# Patient Record
Sex: FEMALE | Race: WHITE | Employment: OTHER | URBAN - METROPOLITAN AREA
[De-identification: names, ages, dates, MRNs, and addresses within clinical notes are randomized per-mention and may not be internally consistent; named-entity substitution may affect disease eponyms.]

---

## 2018-07-11 ENCOUNTER — HOSPITAL ENCOUNTER (OUTPATIENT)
Dept: PHYSICAL THERAPY | Facility: HOSPITAL | Age: 78
Setting detail: THERAPIES SERIES
Discharge: HOME | End: 2018-07-11
Payer: MEDICARE

## 2018-07-11 DIAGNOSIS — Z96.659 STATUS POST KNEE REPLACEMENT, UNSPECIFIED LATERALITY: Primary | ICD-10-CM

## 2018-07-11 PROCEDURE — 97163 PT EVAL HIGH COMPLEX 45 MIN: CPT | Mod: GP | Performed by: PHYSICAL THERAPIST

## 2018-07-11 PROCEDURE — 97110 THERAPEUTIC EXERCISES: CPT | Mod: GP | Performed by: PHYSICAL THERAPIST

## 2018-07-11 PROCEDURE — G8979 MOBILITY GOAL STATUS: HCPCS | Mod: GP,CK | Performed by: PHYSICAL THERAPIST

## 2018-07-11 PROCEDURE — G8978 MOBILITY CURRENT STATUS: HCPCS | Mod: GP,CK | Performed by: PHYSICAL THERAPIST

## 2018-07-12 NOTE — INTERDISCIPLINARY/THERAPY
[facility] PHYSICAL THERAPY  [address] [address]  Dept: [phone]    [facility] Physical Therapy Initial Evaluation     Patient Name: Key Patel  Referring Doctor: Dr. Gagan Holley   Date of Service:7/11/18  Medicare Onset Date: 5/30/18  Medicare SOC Date: 7/11/18  Medicare Prior Hospitalizations: N/A  Medicare Certification Period: 7/11/18 through 8/22/18  HICN: [id number]    Primary Medical Diagnosis: Status post knee replacement, unspecified laterality [Z96.659]     Treatment Diagnosis. s/p R TKA on 5/30/18 limiting pt's functional mobility     Visit Number: 1    Subjective:    Subjective Comments: Pt. reports that she underwent a R total knee replacement on 5/30/18. She reports that she had a 2-3 day stay in the hospital and was then discharged home and started home health physical therapy and stopped using an AD at the end of two weeks. She states that she then began outpatient therapy where the therapist was very aggressive with pushing on her knee to gain ROM and she states that she would end most sessions in tears. She states that she thinks that her and her  are visiting her sister for the next 3 weeks and her doctor wanted her to continue therapy while she was gone. She states that she never really got any home program from her previous PT but has been continuing to work on bending and straightening knee but she really isn't sure what she should expect and isn't sure if she is progressing well.          No past medical history on file.    No current outpatient prescriptions on file.    Patient has no allergy information on record.        Pain:  Pain Assessment  Pain Assessment: 0-10  Pain Score: 2  Pain Type: Surgical pain  Pain Location: Knee  Pain Orientation: Right  Pain Descriptors: Aching  Pain Frequency: Intermittent  Pain Onset: Ongoing  Clinical Progression: Not changed  Effect of Pain on Daily Activities: still getting cramping at night; pt prescibed  oxycotin for night pain; pt reports she has tried to decrease amount but is unable to sleep through the night without pain and cramping   Patient's Stated Pain Goal: No pain  Pain Interventions: Medication (See MAR), Cold applied    Activities limited by Patient's complaint: 1: walking , 2: standing  and 3: stairs       Objective:       Tests  Special Tests: slump test pos for pain in back and down leg; quad lag 10 degrees on R  General  Chart Reviewed: Yes  Therapy Treatment Diagnosis: s/p R TKA on 5/30/18 limiting pt's functional mobility      Ambulation  Ambulation: Assessed  Ambulation 1  Surface 1: Level surface  Device 1: No device  Assistance 1: Independent  Quality of Gait 1: step through gait pattern noted; slight antalgic on RLE            RLE Assessment  RLE Assessment: Exceptions to WFL  AROM RLE (degrees)  R Knee Flexion : 110 degrees  R Knee Extension: -3 degrees  LLE Assessment  LLE Assessment: Exceptions to WFL    Initial Treatment:  Supine  Supine-Exercise Comments: TKE heel lift, sciatic nerve sliders   Seated  Seated-Exercise Comments: knee flexion with forward reaching   Standing  Standing-Exercise Comments: mini squats, and hamstring curls   Other Activities  Other Activities Comments: much time was spent explaining typical recovery post total knee replacement including expectations for strength and ROM 6 weeks post op and typically recovery over the next year; educated pt. in signs of neural tension including cramping   Manual Therapy  Soft Tissue Mobilization: kinesiotape applied in japanese lantern technique for swelling, instructed pt to leave tape on up to 4 days but to remove sooner if skin irritation occurs   Equipment Use  Other Equipment Use: pt. educated in use of Nu Step     Rehab Goals/Potential/Assessment/Plan:    Short-Term Goals:    Short Term PT Goal 1: Pt. will be able to sleep through the night with less than 2 bouts of LE cramping without take pain med within 3 weeks.           Long-Term Goals:    Long Term PT Goal 1: Pt. will improve  R knee AROM from 0-120 degrees to facilitate improved function of RLE within 6 weeks.        Long Term PT Goal 2: Pt. will have negative slump test demonstrating improved neuro dynamics within 6 weeks.                  Assessment  Rehab Potential: Good  Problem List: Decreased strength, Decreased range of motion, Impaired balance, Pain  Assessment Comment: Pt. is a 77 yr old female who presents to physical therapy s/p R TKA on 5/30/2018. She initiated PT in [place] but is visiting her sister for the next 3 weeks and wants to continue therapy. She still demonstrates continued weakness, and ROM deficits consistent with diagnosis. She has had a previous poor experience with PT and slow progress for ROM and strength will be crucial. She requires skilled PT intervention to reduce pain and improved ability to perform ADLs with less pain and reducing fall risk.     Prognosis  Assessment: Decreased ADL status, Decreased LE ROM, Decreased LE strength, Decreased high-level ADLs  Prognosis: Good    Plan  Treatment Interventions: Gait training, Balance training, Stair training, Therapeutic exercises, Manual therapy, Modalities  PT Frequency: 1-2x/wk  Treatment Duration : 4-6 weeks   PT - Next Appointment: 07/13/18  Plan Comment: Pt. is in agreement with the above POC and goals; follow up on response to taping, prone hamstring curls   Date POC Due: 08/22/18        Thank you for allowing us to share in the care of this patient.  If you have any questions, recommendations, or further concerns regarding this patient, please feel free to contact me at [phone].  Signed by: ROSAS MORFIN, PT  7/12/2018  3:26 PM       ______________________________________________  Dr. Gagan Holley    Date  Time    * I have reviewed the plan of care and certify a continuing need for medically necessary services

## 2018-07-13 ENCOUNTER — HOSPITAL ENCOUNTER (OUTPATIENT)
Dept: PHYSICAL THERAPY | Facility: HOSPITAL | Age: 78
Setting detail: THERAPIES SERIES
Discharge: HOME | End: 2018-07-13
Payer: MEDICARE

## 2018-07-13 PROCEDURE — 97140 MANUAL THERAPY 1/> REGIONS: CPT | Mod: GP | Performed by: PHYSICAL THERAPIST

## 2018-07-13 PROCEDURE — 97110 THERAPEUTIC EXERCISES: CPT | Mod: GP | Performed by: PHYSICAL THERAPIST

## 2018-07-13 NOTE — INTERDISCIPLINARY/THERAPY
[facility] PT Daily Treatment Note      Patient Name: Key Patel  Date of Service: 7/13/2018      Visit Number: 2    Subjective:    Subjective Comments: Pt. reports she's been able to go up and down the stairs into the camper easier.         Pain:  Pain Assessment  Pain Assessment: 0-10  Pain Score: 2  Pain Type: Surgical pain  Pain Location: Knee  Pain Orientation: Right        Objective:      Treatment:      Supine  Supine-Exercise Comments: TKE heel lift x 10; x 10 combined with ball sqeueze, heel slides with active assist at end range; sciatic nerve sliders   Seated  Seated-Exercise Comments: resisted hamstring curl with lvl 3 band x 10   Standing  Standing-Exercise Comments: step up/down on 4 in step ascending with RLE descending with LLE; lateral step downs from 4 in step with UE support 2x10   Other Exercise  Other Exercise Comment: gait drills including high marching and on heel strike   Equipment Use  Other Equipment Use: Active warm up on Nu Step x 5 min        Rehab Goals/Potential/Assessment/Plan:    Short-Term Goals:    Short Term PT Goal 1: Pt. will be able to sleep through the night with less than 2 bouts of LE cramping without take pain med within 3 weeks.                     Long-Term Goals:    Long Term PT Goal 1: Pt. will improve  R knee AROM from 0-120 degrees to facilitate improved function of RLE within 6 weeks.        Long Term PT Goal 2: Pt. will have negative slump test demonstrating improved neuro dynamics within 6 weeks.                     Assessment  Rehab Potential: Good  Progress: Progressing toward goals  Problem List: Decreased strength, Decreased range of motion, Impaired balance, Pain  Assessment Comment: Pt. demonstrated improved control with descending step following lateral step downs.          Plan  Treatment Interventions: Gait training, Therapeutic exercises, Manual therapy, Modalities, Balance training, Stair training  PT Frequency: 1-2x/wk  Plan Comment: continue with  strengthening and motor control exercises         Signed by: ROSAS MORFIN, PT  7/13/2018  4:22 PM

## 2018-07-20 ENCOUNTER — HOSPITAL ENCOUNTER (OUTPATIENT)
Dept: PHYSICAL THERAPY | Facility: HOSPITAL | Age: 78
Setting detail: THERAPIES SERIES
Discharge: HOME | End: 2018-07-20
Payer: MEDICARE

## 2018-07-20 PROCEDURE — 97110 THERAPEUTIC EXERCISES: CPT | Mod: GP

## 2018-07-20 PROCEDURE — 97140 MANUAL THERAPY 1/> REGIONS: CPT | Mod: GP

## 2018-07-20 NOTE — INTERDISCIPLINARY/THERAPY
"[facility] PT Daily Treatment Note      Patient Name: Key Patel  Date of Service: 7/20/2018      Visit Number: 3    Subjective:    Subjective Comments: Increased pain especially on inside of knee especially night.  Using ice and meds to control pain.  Walking w/o AD on unven ground around [facility].  Sleeping better through the night waking about 3 times per night because of pain and ms spasms.  Working on scar mobilizations.        Pain:  Pain Assessment  Pain Assessment: 0-10  Pain Score: 8  Pain Type: Surgical pain  Pain Location: Knee  Pain Orientation: Right  Pain Descriptors: Aching  Pain Interventions: Medication (See MAR), Cold applied    Objective:    PROM RLE (degrees)  R Knee Flexion : 110 degrees  R Knee Extension: -8 degrees  Strength RLE  RLE Strength Comment: extensor lag= -18    Treatment:      Supine  Supine-Exercise Comments: Heel slides on wall.  QS on walll w/ 5 sec holds. SAQ w/ holds 1/2 way through motion eccentrically and concentrically.    Seated  Seated-Exercise Comments: Knee flex/ext bilaterally using green T-band tied around both LE 20 reps 2 sets. Reviewed sciatic nerve stretching in sitting.  Prone  Prone-Exercise Comments: Knee flexion  Standing  Standing-Exercise Comments: Lateral and fwd step ups and step down \"dips\" (not completely stepping down) off 6 inch step w/ parallel bar support  Other Activities  Other Activities Comments: Kinesiotape crossing \"laterns\" w/ 25% stretch.  Skin intact prior w/o redness.  Advised to remove in 4 day sor sooner if painful or itchy.   Manual Therapy  Soft Tissue Mobilization: C-strokes distal quad and med and lateral to incision.  Joint Mobilization: Grade 2 ant/post femoral tibial jt mobs.  Equipment Use  Recumbent Stepper: NuStep for 15 min (on own; no charge)       Rehab Goals/Potential/Assessment/Plan:    Short-Term Goals:    Short Term PT Goal 1: Pt. will be able to sleep through the night with less than 2 bouts of LE cramping without take " pain med within 3 weeks.        Long-Term Goals:    Long Term PT Goal 1: Pt. will improve  R knee AROM from 0-120 degrees to facilitate improved function of RLE within 6 weeks.        Long Term PT Goal 2: Pt. will have negative slump test demonstrating improved neuro dynamics within 6 weeks.     Assessment  Rehab Potential: Good  Progress: Progressing toward goals  Problem List: Decreased strength, Decreased range of motion, Impaired balance, Decreased mobility, Pain  Assessment Comment: Cont. discomfort through knee.  Concerned about progress.      Prognosis  Prognosis: Good    Plan  Treatment Interventions: Gait training, Therapeutic activities, Therapeutic exercises, Manual therapy  PT Frequency: 1-2x/wk  Plan Comment: Cont. per POC adv w/ stretching and strenghening.  Manual therapy and taping to decrease tension and increase circulation.        Signed by: SAY HILL PT  7/20/2018  2:54 PM

## 2018-07-23 ENCOUNTER — HOSPITAL ENCOUNTER (OUTPATIENT)
Dept: PHYSICAL THERAPY | Facility: HOSPITAL | Age: 78
Setting detail: THERAPIES SERIES
Discharge: HOME | End: 2018-07-23
Payer: MEDICARE

## 2018-07-23 PROCEDURE — 97110 THERAPEUTIC EXERCISES: CPT | Mod: GP | Performed by: PHYSICAL THERAPIST

## 2018-07-23 PROCEDURE — 97112 NEUROMUSCULAR REEDUCATION: CPT | Mod: GP | Performed by: PHYSICAL THERAPIST

## 2018-07-23 PROCEDURE — 97116 GAIT TRAINING THERAPY: CPT | Mod: GP | Performed by: PHYSICAL THERAPIST

## 2018-07-23 NOTE — INTERDISCIPLINARY/THERAPY
"[facility] PT Daily Treatment Note      Patient Name: Key Patel  Date of Service: 7/23/2018  Visit Number: 4    Subjective:  Subjective Comments: Pt. reports she experienced a fall on the grass yesterday, felt like her knee \"gave\" out.    Pain:  No denies pain.    Objective:  AROM RLE (degrees)  R Knee Flexion : 115 degrees  R Knee Extension: -5 degrees  PROM RLE (degrees)  R Knee Extension: -3 degrees  Strength RLE  RLE Strength Comment: extensor lag -10 degrees, unable to ascend 4\" step with smooth transition    Treatment:  Supine-Exercise Comments: LAQ's and SAQ's with therapist assist to full knee extension and hold with eccentric lengthening, setaed hamstring stretch with active quad sets 5 second hold 15 reps  Seated-Exercise Comments: knee flexion stretch, LAQ's  Standing-Exercise Comments: lift and place to 6\" step x 20 reps  Other Activities Comments: Kinesio tape lymphatic application x 2     Rehab Goals/Potential/Assessment/Plan:  Assessment  Rehab Potential: Good  Progress: Progressing toward goals  Problem List: Decreased strength, Decreased range of motion, Decreased endurance    Plan  Treatment Interventions: Gait training, Therapeutic activities, Therapeutic exercises  PT Frequency: 1-2x/wk  Plan Comment: Continue per POC    Recommendation  Recommendations for Therapy: Continue skilled therapy    Signed by: BRISEIDA MG, PT  7/23/2018  3:51 PM  "

## 2018-07-25 ENCOUNTER — HOSPITAL ENCOUNTER (OUTPATIENT)
Dept: PHYSICAL THERAPY | Facility: HOSPITAL | Age: 78
Setting detail: THERAPIES SERIES
Discharge: HOME | End: 2018-07-25
Payer: MEDICARE

## 2018-07-25 PROCEDURE — 97140 MANUAL THERAPY 1/> REGIONS: CPT | Mod: GP

## 2018-07-25 PROCEDURE — 97110 THERAPEUTIC EXERCISES: CPT | Mod: GP

## 2018-07-25 PROCEDURE — G8980 MOBILITY D/C STATUS: HCPCS | Mod: GP,CJ

## 2018-07-25 PROCEDURE — G8978 MOBILITY CURRENT STATUS: HCPCS | Mod: GP,CJ

## 2018-07-25 PROCEDURE — G8979 MOBILITY GOAL STATUS: HCPCS | Mod: GP,CK

## 2018-07-27 NOTE — INTERDISCIPLINARY/THERAPY
[facility] Physical Therapy Discharge Note      Patient Name: Key Patel  Referring Doctor: Dr. Gagan Holley  Date of Service: 7/25/2018     Visit Number: 5    Primary Medical Diagnosis: Status post knee replacement, unspecified laterality [Z96.659]     Treatment Diagnosis. s/p R TKA on 5/30/18 limiting pt's functional mobility     This patient has been discharged from Physical Therapy because pt will be traveling and then returning to [place] and will not be back at this facility.    Visits from start of care:  Treatments were initiated on 7/11/18  and 5 treatments have been provided. Treatments included stretching, strengthening, taping, and manual therapy.    Patient Compliance: good    Previously established goals:     Short-Term Goals:    Short Term PT Goal 1: Pt. will be able to sleep through the night with less than 2 bouts of LE cramping without take pain med within 3 weeks.   Short Term PT Goal Progress 1: Other (Comment) Not reassessed.               Long-Term Goals:    Long Term PT Goal 1: Pt. will improve  R knee AROM from 0-120 degrees to facilitate improved function of RLE within 6 weeks.   Long Term PT Goal Progress 1: Progressing    Long Term PT Goal 2: Pt. will have negative slump test demonstrating improved neuro dynamics within 6 weeks.   Long Term PT Goal Progress 2: Other (Comment)Not reassessed.           Present Significant Findings: limitations in ROM and strength    Subjective:    Subjective Comments: Pt states knee is achy but hasn't really changed since before fall.  Pt states going to [place], then [place] and then [place] so won't be back to PT here.           Pain Assessment  Pain Assessment: 0-10  Pain Score: 4  Pain Type: Surgical pain  Pain Location: Knee  Pain Orientation: Right  Pain Descriptors: Aching      Objective:        PROM RLE (degrees)  R Knee Flexion : 118 degrees  R Knee Extension: -5 degrees         Supine  Supine-Exercise Comments: SAQ and SLR w/ holds.     Seated  Seated-Exercise Comments: Prolonged knee flexion.  Reviewed flex/ext adv to blue T-band.  Gave burgundy so to progress.   Standing  Standing-Exercise Comments: Reviewed lateral and fwd step ups and fwd step downs on 4 inch step w/ parallel bar support.  HEP given.  Began squats of std YOSVANY, R foot fwd, L foot fwd, feet wide apart, and feet together returning to std YOSVANY.  All 5 reps w/ parallel bar support. HEP given.    Other Activities  Other Activities Comments: Kinesiotape w/ crossing webs w/ 25% stretch.  Skin w/o redness and intact prior to taping.  Advised to remove in 4 days or sooner if painful or itchy.  Took picture w/ pt's camera w/ permission so pt can tape own knee.   Manual Therapy  Joint Mobilization: Grade 2 ant/post grade 2 femoral tibial jt mobs.    Equipment Use  Recumbent Stepper: NuStep level 5 for 10 min (on own, no charge)        Discharge recommendations: Pt will cont. HEP on own.  She will contact her doctor if wanting further PT once in [place].        Thank you for allowing us to share in the care of this patient. If you have any questions, recommendations, or further concerns regarding this patient, please feel free to contact us at [phone].      Signed by: SAY HILL PT  7/27/2018  11:36 AM

## 2022-04-15 ENCOUNTER — OFFICE VISIT (OUTPATIENT)
Dept: URBAN - METROPOLITAN AREA CLINIC 10 | Facility: CLINIC | Age: 82
End: 2022-04-15
Payer: MEDICARE

## 2022-04-15 DIAGNOSIS — H35.371 PUCKERING OF MACULA, RIGHT EYE: ICD-10-CM

## 2022-04-15 DIAGNOSIS — H43.313 VITREOUS MEMBRANES AND STRANDS, BILATERAL: ICD-10-CM

## 2022-04-15 DIAGNOSIS — H25.813 COMBINED FORMS OF AGE-RELATED CATARACT, BILATERAL: Primary | ICD-10-CM

## 2022-04-15 DIAGNOSIS — H04.123 TEAR FILM INSUFFICIENCY OF BILATERAL LACRIMAL GLANDS: ICD-10-CM

## 2022-04-15 PROCEDURE — 99204 OFFICE O/P NEW MOD 45 MIN: CPT | Performed by: OPTOMETRIST

## 2022-04-15 PROCEDURE — 92134 CPTRZ OPH DX IMG PST SGM RTA: CPT | Performed by: OPTOMETRIST

## 2022-04-15 NOTE — IMPRESSION/PLAN
Impression: Combined forms of age-related cataract, bilateral: H25.813. Plan:  Discussed cataract diagnosis with the patient. Discussed and reviewed treatment options for cataracts. Surgical treatment is required for cataracts. Risks and benefits of surgical treatment were discussed and understood. Patient elects surgical treatment. Recommend surgery OU, OD first.
--Discussed IOL options. Pt. has natural monovision and rarely wears glasses so consider distance OD, near OS. OK for ORA and LensX. Possible toric. --Discussed ERM and PVD may limit BCVA p sx.

## 2022-04-15 NOTE — IMPRESSION/PLAN
Impression: Tear film insufficiency of bilateral lacrimal glands: H04.123. Plan: Begin ATs bid-qid OU. Mild irritation OD. MGD OU.

## 2022-05-09 ENCOUNTER — ADULT PHYSICAL (OUTPATIENT)
Dept: URBAN - METROPOLITAN AREA CLINIC 10 | Facility: CLINIC | Age: 82
End: 2022-05-09
Payer: MEDICARE

## 2022-05-09 DIAGNOSIS — H25.813 COMBINED FORMS OF AGE-RELATED CATARACT, BILATERAL: Primary | ICD-10-CM

## 2022-05-09 DIAGNOSIS — Z01.818 ENCOUNTER FOR OTHER PREPROCEDURAL EXAMINATION: Primary | ICD-10-CM

## 2022-05-09 PROCEDURE — 99202 OFFICE O/P NEW SF 15 MIN: CPT | Performed by: PHYSICIAN ASSISTANT

## 2022-05-09 PROCEDURE — 92025 CPTRIZED CORNEAL TOPOGRAPHY: CPT | Performed by: OPHTHALMOLOGY

## 2022-05-17 ENCOUNTER — PRE-OPERATIVE VISIT (OUTPATIENT)
Dept: URBAN - METROPOLITAN AREA CLINIC 10 | Facility: CLINIC | Age: 82
End: 2022-05-17
Payer: MEDICARE

## 2022-05-17 DIAGNOSIS — H43.313 VITREOUS MEMBRANES AND STRANDS, BILATERAL: ICD-10-CM

## 2022-05-17 DIAGNOSIS — H35.371 PUCKERING OF MACULA, RIGHT EYE: ICD-10-CM

## 2022-05-17 DIAGNOSIS — H04.123 TEAR FILM INSUFFICIENCY OF BILATERAL LACRIMAL GLANDS: ICD-10-CM

## 2022-05-17 PROCEDURE — 99204 OFFICE O/P NEW MOD 45 MIN: CPT | Performed by: OPHTHALMOLOGY

## 2022-05-17 ASSESSMENT — INTRAOCULAR PRESSURE
OS: 11
OD: 11

## 2022-05-26 ENCOUNTER — SURGERY (OUTPATIENT)
Dept: URBAN - METROPOLITAN AREA SURGERY 5 | Facility: SURGERY | Age: 82
End: 2022-05-26
Payer: MEDICARE

## 2022-05-26 PROCEDURE — 66984 XCAPSL CTRC RMVL W/O ECP: CPT | Performed by: OPHTHALMOLOGY

## 2022-05-26 RX ORDER — PREDNISOLONE ACETATE 10 MG/ML
1 % SUSPENSION/ DROPS OPHTHALMIC
Qty: 5 | Refills: 1 | Status: ACTIVE
Start: 2022-05-26

## 2022-05-26 RX ORDER — DICLOFENAC SODIUM 1 MG/ML
0.1 % SOLUTION/ DROPS OPHTHALMIC
Qty: 5 | Refills: 1 | Status: ACTIVE
Start: 2022-05-26

## 2022-05-27 ENCOUNTER — POST-OPERATIVE VISIT (OUTPATIENT)
Dept: URBAN - METROPOLITAN AREA CLINIC 10 | Facility: CLINIC | Age: 82
End: 2022-05-27
Payer: MEDICARE

## 2022-05-27 DIAGNOSIS — Z48.810 ENCOUNTER FOR SURGICAL AFTERCARE FOLLOWING SURGERY ON A SENSE ORGAN: Primary | ICD-10-CM

## 2022-05-27 PROCEDURE — 99024 POSTOP FOLLOW-UP VISIT: CPT | Performed by: OPTOMETRIST

## 2022-05-27 ASSESSMENT — INTRAOCULAR PRESSURE: OD: 16

## 2022-05-27 NOTE — IMPRESSION/PLAN
Impression: S/P Cataract Extraction by phacoemulsification with IOL placement OD - 1 Day. Encounter for surgical aftercare following surgery on a sense organ  Z48.810. Post operative instructions reviewed - Plan: Dexycu c gtts. Begin pred acetate and diclofenac tid x 2 weeks Pt. is noticing negative dysphotopsia. Discussed this should improve c time. RTC As scheduled next week for IOP check and Mrx.

## 2022-06-02 ENCOUNTER — POST-OPERATIVE VISIT (OUTPATIENT)
Dept: URBAN - METROPOLITAN AREA CLINIC 10 | Facility: CLINIC | Age: 82
End: 2022-06-02
Payer: MEDICARE

## 2022-06-02 DIAGNOSIS — Z48.810 ENCOUNTER FOR SURGICAL AFTERCARE FOLLOWING SURGERY ON A SENSE ORGAN: Primary | ICD-10-CM

## 2022-06-02 PROCEDURE — 99024 POSTOP FOLLOW-UP VISIT: CPT | Performed by: OPTOMETRIST

## 2022-06-02 ASSESSMENT — INTRAOCULAR PRESSURE
OD: 16
OS: 15

## 2022-06-02 ASSESSMENT — VISUAL ACUITY
OD: 20/30
OS: 20/40

## 2022-06-02 NOTE — IMPRESSION/PLAN
Impression: S/P Cataract Extraction by phacoemulsification with IOL placement OD - 7 Days. Encounter for surgical aftercare following surgery on a sense organ  Z48.810. Condition is improving - Plan: Pt. would like to hold on sx OS. Happy c near vision OD but feels loss of distance vision. Understands expectations. Consider distance aim OS if pt. elects sx OS. RTC 3 weeks for IOP check, MRx and Mac OCT.

## 2022-06-20 NOTE — IMPRESSION/PLAN
Impression: Combined forms of age-related cataract, bilateral: H25.813. Plan: Discussed cataract diagnosis with the patient. Risks and benefits of surgical treatment were discussed and understood. Patient elects surgical treatment. Specialty lens options, LenSx and ORA discussed and patient declines. Patient does not mind wearing glasses after surgery. Patient desires surgery OU,  OD First.  Standard IOL OU,  AIM= Near OU, DEXYCU OU (Dexycu+pred drops OD given ERM),  Declined AMP. Patient understands the need for glasses after surgery for BCVA.
Impression: Puckering of macula, right eye: H35.371. Plan: Mild ERM c no CME. Monitor.   
Will limit vision after cat sx
Impression: Tear film insufficiency of bilateral lacrimal glands: H04.123. Plan: Begin ATs bid-qid OU. Mild irritation OD. MGD OU.
Impression: Vitreous membranes and strands, bilateral: H43.313. Plan: Discussed findings in detail. Discussed signs and symptoms of RD and RTC STAT if noticed. No retinal pathology.
Statement Selected

## 2022-06-22 ENCOUNTER — POST-OPERATIVE VISIT (OUTPATIENT)
Dept: URBAN - METROPOLITAN AREA CLINIC 10 | Facility: CLINIC | Age: 82
End: 2022-06-22
Payer: MEDICARE

## 2022-06-22 DIAGNOSIS — H52.223 REGULAR ASTIGMATISM, BILATERAL: Primary | ICD-10-CM

## 2022-06-22 DIAGNOSIS — Z48.810 ENCOUNTER FOR SURGICAL AFTERCARE FOLLOWING SURGERY ON A SENSE ORGAN: ICD-10-CM

## 2022-06-22 PROCEDURE — 99024 POSTOP FOLLOW-UP VISIT: CPT | Performed by: OPTOMETRIST

## 2022-06-22 ASSESSMENT — VISUAL ACUITY
OS: 20/40
OD: 20/20

## 2022-06-22 ASSESSMENT — INTRAOCULAR PRESSURE
OS: 14
OD: 14

## 2022-06-22 NOTE — IMPRESSION/PLAN
Impression: S/P Cataract Extraction by phacoemulsification with IOL placement OD Encounter for surgical aftercare following surgery on a sense organ  Z48.810. Condition is improving - Plan: Pt. would like to hold on sx OS. Happy c near vision OD but feels loss of distance vision. Understands expectations. Consider distance aim OS if pt. elects sx OS. RTC 6 months for complete exam.  MRx today.